# Patient Record
Sex: MALE | Race: BLACK OR AFRICAN AMERICAN | NOT HISPANIC OR LATINO | Employment: STUDENT | URBAN - METROPOLITAN AREA
[De-identification: names, ages, dates, MRNs, and addresses within clinical notes are randomized per-mention and may not be internally consistent; named-entity substitution may affect disease eponyms.]

---
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
## 2022-12-27 ENCOUNTER — OFFICE VISIT (OUTPATIENT)
Dept: INTERNAL MEDICINE | Facility: CLINIC | Age: 22
End: 2022-12-27
Payer: COMMERCIAL

## 2022-12-27 VITALS
OXYGEN SATURATION: 99 % | HEIGHT: 70 IN | BODY MASS INDEX: 24.76 KG/M2 | HEART RATE: 71 BPM | WEIGHT: 172.94 LBS | SYSTOLIC BLOOD PRESSURE: 131 MMHG | DIASTOLIC BLOOD PRESSURE: 72 MMHG

## 2022-12-27 DIAGNOSIS — Z00.00 ANNUAL PHYSICAL EXAM: Primary | ICD-10-CM

## 2022-12-27 PROCEDURE — 1160F RVW MEDS BY RX/DR IN RCRD: CPT | Mod: CPTII,S$GLB,, | Performed by: PHYSICIAN ASSISTANT

## 2022-12-27 PROCEDURE — 1160F PR REVIEW ALL MEDS BY PRESCRIBER/CLIN PHARMACIST DOCUMENTED: ICD-10-PCS | Mod: CPTII,S$GLB,, | Performed by: PHYSICIAN ASSISTANT

## 2022-12-27 PROCEDURE — 3008F PR BODY MASS INDEX (BMI) DOCUMENTED: ICD-10-PCS | Mod: CPTII,S$GLB,, | Performed by: PHYSICIAN ASSISTANT

## 2022-12-27 PROCEDURE — 3008F BODY MASS INDEX DOCD: CPT | Mod: CPTII,S$GLB,, | Performed by: PHYSICIAN ASSISTANT

## 2022-12-27 PROCEDURE — 3075F PR MOST RECENT SYSTOLIC BLOOD PRESS GE 130-139MM HG: ICD-10-PCS | Mod: CPTII,S$GLB,, | Performed by: PHYSICIAN ASSISTANT

## 2022-12-27 PROCEDURE — 99214 OFFICE O/P EST MOD 30 MIN: CPT | Mod: S$GLB,,, | Performed by: PHYSICIAN ASSISTANT

## 2022-12-27 PROCEDURE — 3078F PR MOST RECENT DIASTOLIC BLOOD PRESSURE < 80 MM HG: ICD-10-PCS | Mod: CPTII,S$GLB,, | Performed by: PHYSICIAN ASSISTANT

## 2022-12-27 PROCEDURE — 99999 PR PBB SHADOW E&M-EST. PATIENT-LVL IV: ICD-10-PCS | Mod: PBBFAC,,, | Performed by: PHYSICIAN ASSISTANT

## 2022-12-27 PROCEDURE — 1159F PR MEDICATION LIST DOCUMENTED IN MEDICAL RECORD: ICD-10-PCS | Mod: CPTII,S$GLB,, | Performed by: PHYSICIAN ASSISTANT

## 2022-12-27 PROCEDURE — 3078F DIAST BP <80 MM HG: CPT | Mod: CPTII,S$GLB,, | Performed by: PHYSICIAN ASSISTANT

## 2022-12-27 PROCEDURE — 99214 PR OFFICE/OUTPT VISIT, EST, LEVL IV, 30-39 MIN: ICD-10-PCS | Mod: S$GLB,,, | Performed by: PHYSICIAN ASSISTANT

## 2022-12-27 PROCEDURE — 3075F SYST BP GE 130 - 139MM HG: CPT | Mod: CPTII,S$GLB,, | Performed by: PHYSICIAN ASSISTANT

## 2022-12-27 PROCEDURE — 99999 PR PBB SHADOW E&M-EST. PATIENT-LVL IV: CPT | Mod: PBBFAC,,, | Performed by: PHYSICIAN ASSISTANT

## 2022-12-27 PROCEDURE — 1159F MED LIST DOCD IN RCRD: CPT | Mod: CPTII,S$GLB,, | Performed by: PHYSICIAN ASSISTANT

## 2022-12-27 NOTE — PROGRESS NOTES
Internal Medicine Annual Exam       CHIEF COMPLAINT     The patient, Richardson Hernandez, who is a 22 y.o. male presents for an annual exam.    HPI     PCP is Abhay Escalante MD, patient is new to me.     Patient presents for annual exam. He was previously seen by Dr. Escalante in pediatrics. He has a family history of lupus. He has allergic rhinitis, headaches, and nasal septal deviation. Today in clinic he is feeling well and has no complaints. He would like to establish care with a PCP MD here at [facility].     HM: appropriate labs, discussed vaccinations and importance of diet and exercise.      Past Medical History:  Past Medical History:   Diagnosis Date    Otitis media        Past Surgical History:   Procedure Laterality Date    TYMPANOSTOMY TUBE PLACEMENT  1/2011        Family History   Problem Relation Age of Onset    Other Maternal Aunt         Lupus    Other Maternal Grandmother         Lupus    Hypertension Maternal Grandmother     Hypertension Mother     Hypertension Maternal Grandfather     No Known Problems Father         Social History     Socioeconomic History    Marital status: Single   Tobacco Use    Smoking status: Never    Smokeless tobacco: Never   Substance and Sexual Activity    Alcohol use: No    Drug use: No    Sexual activity: Never   Social History Narrative    Lives with mother, father, older brother.  No pets, no smoking, no firearms.          Social History     Tobacco Use   Smoking Status Never   Smokeless Tobacco Never        Allergies as of 12/27/2022 - Reviewed 12/27/2022   Allergen Reaction Noted    Augmentin [amoxicillin-pot clavulanate] Hives and Rash 10/07/2016    Codeine Rash 07/17/2018          Home Medications:  Prior to Admission medications    Medication Sig Start Date End Date Taking? Authorizing Provider   fluticasone propionate (FLONASE) 50 mcg/actuation nasal spray 2 sprays (100 mcg total) by Each Nostril route once daily. 9/20/19  Yes LEONIDES Centeno MD   clindamycin  "(CLEOCIN) 300 MG capsule Take 300 mg by mouth every 6 (six) hours.    Historical Provider       Review of Systems:  Review of Systems   Constitutional:  Negative for chills and fever.   HENT:  Negative for sore throat and trouble swallowing.    Eyes:  Negative for visual disturbance.   Respiratory:  Negative for cough and shortness of breath.    Cardiovascular:  Negative for chest pain.   Gastrointestinal:  Negative for abdominal pain, constipation, diarrhea, nausea and vomiting.   Genitourinary:  Negative for dysuria and flank pain.   Musculoskeletal:  Negative for back pain, neck pain and neck stiffness.   Skin:  Negative for rash.   Neurological:  Negative for dizziness, syncope, weakness and headaches.   Psychiatric/Behavioral:  Negative for confusion.      Health Maintainence:   Immunizations:  Health Maintenance         Date Due Completion Date    Hepatitis C Screening Never done ---    Lipid Panel Never done ---    HIV Screening Never done ---    COVID-19 Vaccine (2 - Booster for [place] series) 05/31/2021 4/5/2021    Influenza Vaccine (1) 09/01/2022 2/12/2016    TETANUS VACCINE 03/11/2023 3/11/2013    DTaP/Tdap/Td Vaccines (7 - Td or Tdap) 03/11/2023 3/11/2013             PHYSICAL EXAM     /72 (BP Location: Right arm, Patient Position: Sitting, BP Method: Small (Automatic))   Pulse 71   Ht 5' 10" (1.778 m)   Wt 78.4 kg (172 lb 15.2 oz)   SpO2 99%   BMI 24.82 kg/m²  Body mass index is 24.82 kg/m².    Physical Exam  Vitals and nursing note reviewed.   Constitutional:       Appearance: Normal appearance.      Comments: Healthy appearing male in NAD or apparent pain. He makes good eye contact, speaks in clear full sentences and ambulates with ease.        HENT:      Head: Normocephalic and atraumatic.      Nose: Nose normal.      Mouth/Throat:      Pharynx: Oropharynx is clear.   Eyes:      Conjunctiva/sclera: Conjunctivae normal.   Cardiovascular:      Rate and Rhythm: Normal rate and regular rhythm. "      Pulses: Normal pulses.   Pulmonary:      Effort: No respiratory distress.   Abdominal:      Tenderness: There is no abdominal tenderness.   Musculoskeletal:         General: Normal range of motion.      Cervical back: No rigidity.   Skin:     General: Skin is warm and dry.      Capillary Refill: Capillary refill takes less than 2 seconds.      Findings: No rash.   Neurological:      General: No focal deficit present.      Mental Status: He is alert.      Gait: Gait normal.   Psychiatric:         Mood and Affect: Mood normal.       LABS     No results found for: LABA1C, HGBA1C  CMP  Sodium   Date Value Ref Range Status   12/26/2017 138 136 - 145 mmol/L Final     Potassium   Date Value Ref Range Status   12/26/2017 3.8 3.5 - 5.1 mmol/L Final     Chloride   Date Value Ref Range Status   12/26/2017 106 95 - 110 mmol/L Final     CO2   Date Value Ref Range Status   12/26/2017 24 23 - 29 mmol/L Final     Glucose   Date Value Ref Range Status   12/26/2017 90 70 - 110 mg/dL Final     BUN   Date Value Ref Range Status   12/26/2017 14 5 - 18 mg/dL Final     Creatinine   Date Value Ref Range Status   12/26/2017 1.0 0.5 - 1.4 mg/dL Final     Calcium   Date Value Ref Range Status   12/26/2017 8.9 8.7 - 10.5 mg/dL Final     Total Protein   Date Value Ref Range Status   12/26/2017 7.8 6.0 - 8.4 g/dL Final     Albumin   Date Value Ref Range Status   12/26/2017 3.3 3.2 - 4.7 g/dL Final     Total Bilirubin   Date Value Ref Range Status   12/26/2017 0.5 0.1 - 1.0 mg/dL Final     Comment:     For infants and newborns, interpretation of results should be based  on gestational age, weight and in agreement with clinical  observations.  Premature Infant recommended reference ranges:  Up to 24 hours.............<8.0 mg/dL  Up to 48 hours............<12.0 mg/dL  3-5 days..................<15.0 mg/dL  6-29 days.................<15.0 mg/dL       Alkaline Phosphatase   Date Value Ref Range Status   12/26/2017 77 52 - 171 U/L Final     AST    Date Value Ref Range Status   12/26/2017 22 10 - 40 U/L Final     ALT   Date Value Ref Range Status   12/26/2017 19 10 - 44 U/L Final     Anion Gap   Date Value Ref Range Status   12/26/2017 8 8 - 16 mmol/L Final     eGFR if    Date Value Ref Range Status   12/26/2017 SEE COMMENT >60 mL/min/1.73 m^2 Final     eGFR if non    Date Value Ref Range Status   12/26/2017 SEE COMMENT >60 mL/min/1.73 m^2 Final     Comment:     Calculation used to obtain the estimated glomerular filtration  rate (eGFR) is the CKD-EPI equation.   Test not performed.  GFR calculation is only valid for patients   18 and older.       Lab Results   Component Value Date    WBC 13.59 (H) 12/26/2017    HGB 13.8 12/26/2017    HCT 41.5 12/26/2017    MCV 85 12/26/2017     12/26/2017     No results found for: CHOL  No results found for: HDL  No results found for: LDLCALC  No results found for: TRIG  No results found for: CHOLHDL  No results found for: TSH, A6PCRCE, D0YFBCR, THYROIDAB    ASSESSMENT/PLAN     Richardson Hernandez is a 22 y.o. male    Richardson was seen today for annual exam.    Diagnoses and all orders for this visit:    Annual physical exam  -     CBC Auto Differential; Future  -     HIV 1/2 Ag/Ab (4th Gen); Future  -     HEPATITIS C ANTIBODY; Future  -     Comprehensive Metabolic Panel; Future  -     Hemoglobin A1C; Future  -     Lipid Panel; Future              Vandana Hammer PA-C  Department of Internal Medicine - [facility]   2:45 PM

## 2023-05-31 ENCOUNTER — PATIENT OUTREACH (OUTPATIENT)
Dept: ADMINISTRATIVE | Facility: HOSPITAL | Age: 23
End: 2023-05-31
Payer: COMMERCIAL

## 2023-10-05 NOTE — ED PROVIDER NOTES
Encounter Date: 12/26/2017       History     Chief Complaint   Patient presents with    Eye Drainage    Eye Pain     This is a 17-year-old young man who presents with right eye pain.  Patient states that he has been having right sided eye pain for about one week.  He does not wear contact lenses.  He denies vision changes.  He he denies eye injury.  He states that the eyes have been tearing but has not had any purulent drainage.  The right eye has been somewhat red however.    4 days ago, patient developed runny nose cough congestion.  He had one episode of nonbloody nonbilious emesis and 2 episodes of watery nonbloody stools.  Family members also had similar viral symptoms at the same time.  However when I pain worsened over the past 4 days, he was seen in an urgent care 2 days ago because of progressive right-sided eyelid swelling and pain.  He was diagnosed at that time with right-sided periorbital cellulitis and conjunctivitis and treated with a steroid injection, Bactrim, and also given up her prescription for eyedrops.  However this eyedrop prescription has not yet been filled.  Mother states that after the steroid injection was given patient actually seemed to feel a lot better,.  Swelling of the lids improved dramatically subsequently.  Richardson seemed to be improving clinically.  However today, pain is again severe 10 out of 10.    Patient continues to deny any vision changes such as visual blurriness or diplopia.  He did state that he had some photophobia last week but is no longer having photophobia.  He no longer has any nausea or vomiting.  He continues to have significant nasal congestion and right eye and face pain..  No sore throat.  No trouble breathing.  He did have tooth pain a couple days ago with gingival swelling of the right maxillary 1st molar area, but the swelling and pain resolved after the previous visit.  Richardson attributed the pain to his braces.      Past medical history: No major  medical illnesses  Meds: No regular meds.  Current meds include Bactrim, ibuprofen for pain,  Immunizations up-to-date  NO KNOWN DRUG ALLERGIES  Had multiple root canals in September.  Has braces, recently tightened.      The history is provided by a parent and the patient.     Review of patient's allergies indicates:   Allergen Reactions    Augmentin [amoxicillin-pot clavulanate] Hives and Rash     Past Medical History:   Diagnosis Date    Otitis media      Past Surgical History:   Procedure Laterality Date    TYMPANOSTOMY TUBE PLACEMENT  1/2011     Family History   Problem Relation Age of Onset    Other Maternal Aunt      Lupus    Other Maternal Grandmother      Lupus    Hypertension Maternal Grandmother     Hypertension Mother     Hypertension Maternal Grandfather      Social History   Substance Use Topics    Smoking status: Never Smoker    Smokeless tobacco: Never Used    Alcohol use No     Review of Systems   Constitutional: Negative for fever.   HENT: Positive for congestion, dental problem, facial swelling, postnasal drip and rhinorrhea. Negative for sore throat.    Eyes: Positive for pain and redness. Negative for discharge and visual disturbance.   Respiratory: Positive for cough. Negative for shortness of breath.    Cardiovascular: Negative for chest pain.   Gastrointestinal: Positive for diarrhea and vomiting. Negative for abdominal pain, blood in stool, constipation and nausea.   Genitourinary: Negative for dysuria, frequency and hematuria.   Musculoskeletal: Negative for arthralgias, back pain, joint swelling and myalgias.   Skin: Negative for rash.   Neurological: Negative for weakness and headaches.   Hematological: Does not bruise/bleed easily.       Physical Exam     Initial Vitals [12/26/17 1522]   BP Pulse Resp Temp SpO2   -- 82 18 98.2 °F (36.8 °C) 98 %      MAP       --         Physical Exam    Nursing note and vitals reviewed.  Constitutional: He appears well-developed and  well-nourished. No distress.   HENT:   Head: Normocephalic and atraumatic.   Right Ear: External ear normal.   Left Ear: External ear normal.   Mouth/Throat: Oropharynx is clear and moist.   Right nostril:  Extremely edematous and erythematous mucosa/turbinates, obstructive, scant purulent discharge.  There is right maxillary tenderness.  There is very mild right eyelid swelling   Eyes: EOM are normal. Pupils are equal, round, and reactive to light. Right eye exhibits no discharge (tearing). Left eye exhibits no discharge (Tearing). No scleral icterus.   Right lids mildly puffy no erythema or induration or warth.  Right maxillary tenderness.  No proptosis.l    Mildconjunctival erythema bilaterally (Mom relates this to crying due to pain).    No fluorescein uptake bilaterally.    EOMI but complains of pain with any movement of right eye.      No photophobia elicited.   Neck: Normal range of motion. Neck supple.   Cardiovascular: Regular rhythm, normal heart sounds and intact distal pulses. Exam reveals no gallop and no friction rub.    No murmur heard.  Pulmonary/Chest: Breath sounds normal. No respiratory distress. He has no wheezes. He has no rhonchi. He has no rales.   Abdominal: Soft. Bowel sounds are normal. He exhibits no distension. There is no tenderness. There is no rebound and no guarding.   Musculoskeletal: He exhibits no edema or tenderness.   Lymphadenopathy:     He has no cervical adenopathy.   Neurological: He is alert. No cranial nerve deficit.   Skin: Skin is warm and dry. Capillary refill takes less than 2 seconds. No rash noted. No erythema. No pallor.         ED Course      Patient arrived in severe pain, with findings per PE above.  PE suspicious for maxillary sinusitis, possibly complicated by orbital cellulitis, partially treated with steroids and abx.  DDX eye pain could include eye injury, conjunctivis, keratitis uveitis, cluster HA.  However the constellation of signs and sx suggests  sinusitis..      Given ibuprofen and Afrin to right nostril with almost complete resolution of pain, he is active, using his phone and feels comfortable.  Still with some pain with eye movement however..  Awaiting CT.     Head CT performed, opacification of the right maxillary sinus and ethmoid sinuses, apparent cyst assoc with the root of a maxillary tooth with associated ?metallic object at tip of the cyst.  Small extension to the medial wall fo the right orbit.  '  17 y.o. with sinusitis, small orbital involvement, possible dental involvement.  Failed outpatent management.  Consulted ENT and ophthalmology. CBC, cmp crp esr ordered, blood culture Will plan for admit to peds for management of his sinusitis and close monitoring of the infection and possible orbital involvement.  Discussed admit with Dr. Beauchamp, hospitalist.  Patient and parent updated.  Mom advised to call the dentist in the morning to advise of this complication    Discussed with ENT, agree with plan for admit and IV abx.  Also recommended steroids, irrigations and afrin.   Procedures  Labs Reviewed   CBC W/ AUTO DIFFERENTIAL - Abnormal; Notable for the following:        Result Value    WBC 13.59 (*)     Gran # 9.6 (*)     Mono # 1.4 (*)     Gran% 70.5 (*)     Lymph% 19.2 (*)     All other components within normal limits   CULTURE, BLOOD   COMPREHENSIVE METABOLIC PANEL   C-REACTIVE PROTEIN   SEDIMENTATION RATE, MANUAL          X-Rays:   Independently Interpreted Readings:   Other Readings:  Ct max/fac:  See note    Medical Decision Making:   History:   I obtained history from: someone other than patient.  Old Medical Records: I decided to obtain old medical records.  Old Records Summarized: records from another hospital.       <> Summary of Records: Reviewed the ED visit of 2 days ago.  Initial Assessment:   See note  Differential Diagnosis:   See note  Independently Interpreted Test(s):   I have ordered and independently interpreted X-rays - see  The patient has been re-examined and I agree with the above assessment or I updated with my findings. prior notes.  Clinical Tests:   Lab Tests: Ordered and Reviewed       <> Summary of Lab: See note  Radiological Study: Ordered and Reviewed  ED Management:  See note  Other:   I have discussed this case with another health care provider.                   ED Course      Clinical Impression:   The primary encounter diagnosis was Acute non-recurrent sinusitis, unspecified location. A diagnosis of Cellulitis of right orbital region was also pertinent to this visit.    Disposition:   Disposition: Admitted  Condition: Stable                        Zo Zhao MD  12/26/17 1958       Zo Zhao MD  12/26/17 1958